# Patient Record
Sex: FEMALE | Race: WHITE | URBAN - METROPOLITAN AREA
[De-identification: names, ages, dates, MRNs, and addresses within clinical notes are randomized per-mention and may not be internally consistent; named-entity substitution may affect disease eponyms.]

---

## 2022-02-09 ENCOUNTER — OFFICE VISIT (OUTPATIENT)
Dept: URBAN - METROPOLITAN AREA CLINIC 7 | Facility: CLINIC | Age: 87
End: 2022-02-09
Payer: MEDICARE

## 2022-02-09 DIAGNOSIS — H33.8 OTHER RETINAL DETACHMENTS: ICD-10-CM

## 2022-02-09 DIAGNOSIS — H35.3223 EXUDATIVE AGE-RELATED MACULAR DEGENERATION, LEFT EYE, WITH INACTIVE SCAR: Primary | ICD-10-CM

## 2022-02-09 DIAGNOSIS — H40.9 GLAUCOMA: ICD-10-CM

## 2022-02-09 DIAGNOSIS — H25.11 AGE-RELATED NUCLEAR CATARACT, RIGHT EYE: ICD-10-CM

## 2022-02-09 PROCEDURE — 99213 OFFICE O/P EST LOW 20 MIN: CPT | Performed by: OPHTHALMOLOGY

## 2022-02-09 PROCEDURE — 92134 CPTRZ OPH DX IMG PST SGM RTA: CPT | Performed by: OPHTHALMOLOGY

## 2022-02-09 ASSESSMENT — INTRAOCULAR PRESSURE
OS: 12
OD: 44

## 2022-02-09 NOTE — IMPRESSION/PLAN
Impression: Mature cataract OD Plan: I would not recommend any surgical intervention given the above.

## 2022-02-09 NOTE — IMPRESSION/PLAN
Impression: Exudative age-rel mclr degn, left eye, with inactive scar: Z21.2420.
-Bilateral disciform scarring
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
-OS: disciform scar, no SRF/SRH on exam/OCT

OCT: 02/09/22
no view Plan: The patient has a disciform scar. I recommend observation as there is currently no effective treatment for subretinal scarring from a choroidal neovascular membrane (CNVM). The patient understands that further treatment would only be indicated to prevent enlargement of the central scotoma. I recommend the patient consider low vision aids, and we discussed the importance of yearly dilated eye exams. 

RTC 1 year DFE OU OCT OU

## 2022-02-09 NOTE — IMPRESSION/PLAN
Impression: Glaucoma: H40.9. Plan: S/p diode laser with Dr Tres Jaimes. IOP elevated but pt comfortable. Cont to observe.

## 2023-02-01 ENCOUNTER — OFFICE VISIT (OUTPATIENT)
Dept: URBAN - METROPOLITAN AREA CLINIC 7 | Facility: CLINIC | Age: 88
End: 2023-02-01
Payer: MEDICARE

## 2023-02-01 DIAGNOSIS — H40.9 GLAUCOMA: ICD-10-CM

## 2023-02-01 DIAGNOSIS — H33.8 OTHER RETINAL DETACHMENTS: ICD-10-CM

## 2023-02-01 DIAGNOSIS — H35.3223 EXUDATIVE AGE-REL MCLR DEGN, LEFT EYE, WITH INACTIVE SCAR: Primary | ICD-10-CM

## 2023-02-01 DIAGNOSIS — H25.11 AGE-RELATED NUCLEAR CATARACT, RIGHT EYE: ICD-10-CM

## 2023-02-01 PROCEDURE — 92134 CPTRZ OPH DX IMG PST SGM RTA: CPT | Performed by: OPHTHALMOLOGY

## 2023-02-01 PROCEDURE — 99213 OFFICE O/P EST LOW 20 MIN: CPT | Performed by: OPHTHALMOLOGY

## 2023-02-01 ASSESSMENT — INTRAOCULAR PRESSURE
OS: 10
OD: 10

## 2023-02-01 NOTE — IMPRESSION/PLAN
Impression: Exudative age-rel mclr degn, left eye, with inactive scar: W91.0637.
-Bilateral disciform scarring
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
-OS: disciform scar, no SRF/SRH on exam/OCT

OCT: 02/01/23
no view Plan: The patient has a disciform scar. I recommend observation as there is currently no effective treatment for subretinal scarring from a choroidal neovascular membrane (CNVM). The patient understands that further treatment would only be indicated to prevent enlargement of the central scotoma. I recommend the patient consider low vision aids, and we discussed the importance of yearly dilated eye exams. 

RTC 1 year DFE OU OCT OU

## 2023-02-01 NOTE — IMPRESSION/PLAN
Impression: Glaucoma: H40.9. Plan: S/p diode laser with Dr Kortney Elizabeth. IOP elevated but pt comfortable. Cont to observe.

## 2024-03-20 ENCOUNTER — OFFICE VISIT (OUTPATIENT)
Dept: URBAN - METROPOLITAN AREA CLINIC 13 | Facility: CLINIC | Age: 89
End: 2024-03-20
Payer: MEDICARE

## 2024-03-20 DIAGNOSIS — H33.8 OTHER RETINAL DETACHMENTS: ICD-10-CM

## 2024-03-20 DIAGNOSIS — H40.9 GLAUCOMA: ICD-10-CM

## 2024-03-20 DIAGNOSIS — H35.3223 EXUDATIVE AGE-RELATED MACULAR DEGENERATION, LEFT EYE, WITH INACTIVE SCAR: Primary | ICD-10-CM

## 2024-03-20 DIAGNOSIS — H25.11 AGE-RELATED NUCLEAR CATARACT, RIGHT EYE: ICD-10-CM

## 2024-03-20 PROCEDURE — 99213 OFFICE O/P EST LOW 20 MIN: CPT | Performed by: OPHTHALMOLOGY

## 2024-03-20 PROCEDURE — 92134 CPTRZ OPH DX IMG PST SGM RTA: CPT | Performed by: OPHTHALMOLOGY

## 2024-03-20 ASSESSMENT — INTRAOCULAR PRESSURE
OS: 12
OD: 44